# Patient Record
Sex: FEMALE | Race: WHITE | NOT HISPANIC OR LATINO
[De-identification: names, ages, dates, MRNs, and addresses within clinical notes are randomized per-mention and may not be internally consistent; named-entity substitution may affect disease eponyms.]

---

## 2021-06-14 PROBLEM — Z00.00 ENCOUNTER FOR PREVENTIVE HEALTH EXAMINATION: Status: ACTIVE | Noted: 2021-06-14

## 2021-07-15 ENCOUNTER — APPOINTMENT (OUTPATIENT)
Dept: HEART AND VASCULAR | Facility: CLINIC | Age: 81
End: 2021-07-15
Payer: MEDICARE

## 2021-07-15 ENCOUNTER — NON-APPOINTMENT (OUTPATIENT)
Age: 81
End: 2021-07-15

## 2021-07-15 VITALS
BODY MASS INDEX: 29.66 KG/M2 | SYSTOLIC BLOOD PRESSURE: 150 MMHG | HEIGHT: 67 IN | HEART RATE: 100 BPM | RESPIRATION RATE: 18 BRPM | TEMPERATURE: 96.9 F | DIASTOLIC BLOOD PRESSURE: 96 MMHG | WEIGHT: 189 LBS | OXYGEN SATURATION: 97 %

## 2021-07-15 DIAGNOSIS — Z80.42 FAMILY HISTORY OF MALIGNANT NEOPLASM OF PROSTATE: ICD-10-CM

## 2021-07-15 DIAGNOSIS — R92.1 MAMMOGRAPHIC CALCIFICATION FOUND ON DIAGNOSTIC IMAGING OF BREAST: ICD-10-CM

## 2021-07-15 DIAGNOSIS — Z82.61 FAMILY HISTORY OF ARTHRITIS: ICD-10-CM

## 2021-07-15 DIAGNOSIS — Z86.79 PERSONAL HISTORY OF OTHER DISEASES OF THE CIRCULATORY SYSTEM: ICD-10-CM

## 2021-07-15 DIAGNOSIS — Z86.19 PERSONAL HISTORY OF OTHER INFECTIOUS AND PARASITIC DISEASES: ICD-10-CM

## 2021-07-15 DIAGNOSIS — Z82.49 FAMILY HISTORY OF ISCHEMIC HEART DISEASE AND OTHER DISEASES OF THE CIRCULATORY SYSTEM: ICD-10-CM

## 2021-07-15 DIAGNOSIS — R53.83 OTHER FATIGUE: ICD-10-CM

## 2021-07-15 DIAGNOSIS — R06.00 DYSPNEA, UNSPECIFIED: ICD-10-CM

## 2021-07-15 DIAGNOSIS — Z78.9 OTHER SPECIFIED HEALTH STATUS: ICD-10-CM

## 2021-07-15 DIAGNOSIS — M20.60 ACQUIRED DEFORMITIES OF TOE(S), UNSPECIFIED, UNSPECIFIED FOOT: ICD-10-CM

## 2021-07-15 PROCEDURE — 93000 ELECTROCARDIOGRAM COMPLETE: CPT

## 2021-07-15 PROCEDURE — 99072 ADDL SUPL MATRL&STAF TM PHE: CPT

## 2021-07-15 PROCEDURE — 99205 OFFICE O/P NEW HI 60 MIN: CPT

## 2021-07-15 RX ORDER — CARVEDILOL PHOSPHATE 20 MG/1
20 CAPSULE, EXTENDED RELEASE ORAL DAILY
Refills: 0 | Status: ACTIVE | COMMUNITY

## 2021-07-15 RX ORDER — CHROMIUM 200 MCG
25 MCG TABLET ORAL
Refills: 0 | Status: ACTIVE | COMMUNITY

## 2021-07-15 RX ORDER — DOXYCYCLINE HYCLATE 100 MG/1
100 TABLET ORAL TWICE DAILY
Refills: 0 | Status: DISCONTINUED | COMMUNITY
End: 2021-07-15

## 2021-07-15 NOTE — REASON FOR VISIT
[Arrhythmia/ECG Abnorrmalities] : arrhythmia/ECG abnormalities [Hyperlipidemia] : hyperlipidemia [Hypertension] : hypertension [Symptom and Test Evaluation] : symptom and test evaluation [FreeTextEntry3] : Elizabeth Vega

## 2021-07-15 NOTE — HISTORY OF PRESENT ILLNESS
[FreeTextEntry1] : Millie lOiver is a 79 yo female who presents for CV evaluation.\par \par She denies cp.  She has noted increased fatigue and CLAIRE over the last 1.5 years.  She denies pnd, orthopnea, edema, palp, or loc.\par \par She was noted to have new RBBB on ECG during primary care eval.  She also was noted to have inc Ca level.\par \par She is active but not exercising.  She is compliant with meds.\par \par ECG today reveals NSR, RBBB\par \par Clinical hx reviewed in detail.\par \par Recommendations:\par 1. exercise\par 2. inc po water intake; salt restrict\par 3. watch BP in follow up visits\par 4. EXSE\par 5. carotid duplex\par 6. CPET\par 7. t/c additional eval for BP\par 8. consider Wellness referral\par 8. Endocrine eval to take place

## 2021-07-15 NOTE — DISCUSSION/SUMMARY
[Bundle Branch Block] : ~T bundle branch block [Hyperlipidemia] : hyperlipidemia [Stable] : stable [Diet Modification] : diet modification [Exercise] : exercise [Hypertension] : hypertension [Exercise Regimen] : an exercise regimen [Dietary Modification] : dietary modification [Weight Loss] : weight loss [Low Sodium Diet] : low sodium diet [Deteriorating] : deteriorating [None] : none [Cardiac Rehabilitation Program] : cardiac rehabilitation program [Patient] : the patient [de-identified] : CLAIRE/Fatigue

## 2021-08-10 ENCOUNTER — APPOINTMENT (OUTPATIENT)
Dept: HEART AND VASCULAR | Facility: CLINIC | Age: 81
End: 2021-08-10
Payer: MEDICARE

## 2021-08-10 VITALS
HEIGHT: 67 IN | HEART RATE: 84 BPM | OXYGEN SATURATION: 98 % | BODY MASS INDEX: 29.66 KG/M2 | SYSTOLIC BLOOD PRESSURE: 176 MMHG | WEIGHT: 189 LBS | DIASTOLIC BLOOD PRESSURE: 88 MMHG

## 2021-08-10 PROCEDURE — 93880 EXTRACRANIAL BILAT STUDY: CPT

## 2021-08-10 PROCEDURE — 93351 STRESS TTE COMPLETE: CPT

## 2021-08-10 PROCEDURE — 93320 DOPPLER ECHO COMPLETE: CPT

## 2021-08-10 PROCEDURE — 93325 DOPPLER ECHO COLOR FLOW MAPG: CPT

## 2021-08-23 ENCOUNTER — APPOINTMENT (OUTPATIENT)
Dept: HEART AND VASCULAR | Facility: CLINIC | Age: 81
End: 2021-08-23

## 2021-08-25 ENCOUNTER — NON-APPOINTMENT (OUTPATIENT)
Age: 81
End: 2021-08-25

## 2021-09-02 ENCOUNTER — APPOINTMENT (OUTPATIENT)
Dept: HEART AND VASCULAR | Facility: CLINIC | Age: 81
End: 2021-09-02
Payer: MEDICARE

## 2021-09-02 PROCEDURE — 99443: CPT

## 2021-09-02 RX ORDER — ASPIRIN ENTERIC COATED TABLETS 81 MG 81 MG/1
81 TABLET, DELAYED RELEASE ORAL
Refills: 0 | Status: ACTIVE | COMMUNITY

## 2021-09-02 NOTE — PHYSICAL EXAM

## 2021-09-02 NOTE — REASON FOR VISIT
[Symptom and Test Evaluation] : symptom and test evaluation [Arrhythmia/ECG Abnorrmalities] : arrhythmia/ECG abnormalities [Structural Heart and Valve Disease] : structural heart and valve disease [Hyperlipidemia] : hyperlipidemia [Hypertension] : hypertension [Coronary Artery Disease] : coronary artery disease [Carotid, Aortic and Peripheral Vascular Disease] : carotid, aortic and peripheral vascular disease [FreeTextEntry3] : Elizabeth Vega

## 2021-09-02 NOTE — HISTORY OF PRESENT ILLNESS
[FreeTextEntry1] : Millie Oliver requests for telephone visit.  Consent obtained and recorded.  She is at her home and doctor is in [place], [place].\par \par She denies cp.  Her CLAIRE and fatigue are improved.  She denies pnd, orthopnea, edema, palp, or loc.\par \par She is active but not exercising.  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  Over the last two days, she has developed intermittent fevers at home.  She denies any ateriotomy site redness, swelling, discharge.  She denies cough, sore throat.  She mild decrease in appetite.  Fever responds to Tylenol.\par \par Clinical hx nd results reviewed in detail.\par \par PE from 7/2021 eval.\par \par Recommendations:\par 1. exercise\par 2. inc po water intake; salt restrict\par 3. continue CV meds\par 4. will follow fever with f/u phone call in am.\par 5. cardiac rehab referral\par 6. Endocrine eval to take place

## 2021-09-02 NOTE — REVIEW OF SYSTEMS
[Fever] : fever [Headache] : no headache [Chills] : no chills [Feeling Fatigued] : not feeling fatigued [Abdominal Pain] : no abdominal pain [Nausea] : no nausea [Vomiting] : no vomiting [Heartburn] : no heartburn [Change in Appetite] : change in appetite [Change In The Stool] : no change in stool [Dysphagia] : no dysphagia [Diarrhea] : diarrhea [Constipation] : no constipation [Blood in Stool] : no blood in stool [Negative] : Heme/Lymph

## 2021-09-17 NOTE — DISCUSSION/SUMMARY
[Bundle Branch Block] : ~T bundle branch block [Deteriorating] : deteriorating [Coronary Artery Disease] : coronary artery disease [Responding to Treatment] : responding to treatment [Weight Reduction] : weight reduction [Hyperlipidemia] : hyperlipidemia [None] : There are no changes in medication management [Diet Modification] : diet modification [Exercise] : exercise [Hypertension] : hypertension [Exercise Regimen] : an exercise regimen [Dietary Modification] : dietary modification [Weight Loss] : weight loss [Low Sodium Diet] : low sodium diet [Peripheral Vascular Disease] : peripheral vascular disease [Stable] : stable [Medication Changes Per Orders] : Medication changes are as documented in orders [Exercise Rehab] : exercise rehabilitation [Patient] : the patient [de-identified] : min carotid disease [FreeTextEntry1] : TR - mild independent

## 2021-10-11 ENCOUNTER — APPOINTMENT (OUTPATIENT)
Dept: SURGERY | Facility: CLINIC | Age: 81
End: 2021-10-11
Payer: MEDICARE

## 2021-10-11 VITALS
HEART RATE: 73 BPM | WEIGHT: 175 LBS | BODY MASS INDEX: 27.47 KG/M2 | DIASTOLIC BLOOD PRESSURE: 90 MMHG | SYSTOLIC BLOOD PRESSURE: 159 MMHG | HEIGHT: 67 IN | TEMPERATURE: 97.6 F

## 2021-10-11 PROCEDURE — 99204 OFFICE O/P NEW MOD 45 MIN: CPT

## 2021-10-11 NOTE — ASSESSMENT
[FreeTextEntry1] : 79 yo F with likely primary hyperparathyroidism. \par \par We had a long discussion about thyroid and parathyroid anatomy, physiology and pathophysiology. We discussed the diagnosis of primary hyperparathyroidism and its manifestations as single and double adenomas as well as 4-gland hyperplasia. We spoke about the utility of localization studies and their role in operative planning in terms of focused parathyroidectomy and 4-gland exploration. We discussed intraoperative PTH and nerve monitoring. We discussed the indications for surgery and the risks of surgery including bleeding, infection, recurrence of disease, injury to surrounding structures. We also discussed postop recovery including pain control, activity, diet and possible need for temporary calcium supplementation.\par \par We will also confer with her cardiology team in regards to recently placed cardiac stent and to assess best timing for surgery.\par \par The patient understands all this and would like to proceed.\par Will plan for parathyroidectomy once localization studies are completed and it is deemed safe from a cardiac standpoint. \par \par \par Surgery will be under general anesthesia with intraoperative nerve and PTH monitoring at [facility] as an ambulatory or 23h stay procedure.\par Patient will obtain medical and cardiac clearance prior to surgery.

## 2021-10-11 NOTE — PHYSICAL EXAM
[Respiratory Effort] : normal respiratory effort [Normal Rate and Rhythm] : normal rate and rhythm [No Rash or Lesion] : No rash or lesion [Alert] : alert [Calm] : calm [de-identified] : NAD, well-appearing [de-identified] : Anicteric [de-identified] : Bedside US showed tiny right-sided nodules and a small hypoechoic lesion posteriorly on the left at the midpole possibly a parathyroid.  [de-identified] : soft, nondistended

## 2021-10-11 NOTE — HISTORY OF PRESENT ILLNESS
[de-identified] : 81 yo F referred by Dr. Vanessa for evaluation of primary hyperparathyroidism. The patient was recently noted to have hypercalcemia on routine bloodwork in June 2021. She was referred to Dr. Vanessa for further workup. Her calcium ranges from 9.6 to 10.7, with the most recent labs in 9/2021 showing a calcium of 10 and PTH of 89. Her Vit D is 28.1.  Last DEXA in July 2021 showed osteopenia to T-2.3 in the L femoral neck. A 24 hour urine calcium was low at < 26. She underwent US which showed several sub-cm nodules in the thyroid. \par Patient reports fatigue, increased thirst and memory trouble but denies sara pains, joint pains, GI disturbances, kidney stones, anxiety/depression. \par Of note, she had recent cardiac cath with stent placement in 8/2021. She is no longer on Plavix.

## 2021-10-11 NOTE — CONSULT LETTER
[Dear  ___] : Dear  [unfilled], [( Thank you for referring [unfilled] for consultation for _____ )] : Thank you for referring [unfilled] for consultation for [unfilled] [Please see my note below.] : Please see my note below. [Consult Closing:] : Thank you very much for allowing me to participate in the care of this patient.  If you have any questions, please do not hesitate to contact me. [Sincerely,] : Sincerely, [FreeTextEntry3] : La Vides MD [___] : [unfilled]

## 2021-10-12 LAB
ANION GAP SERPL CALC-SCNC: 10 MMOL/L
BUN SERPL-MCNC: 23 MG/DL
CALCIUM SERPL-MCNC: 10.1 MG/DL
CHLORIDE SERPL-SCNC: 106 MMOL/L
CO2 SERPL-SCNC: 25 MMOL/L
CREAT SERPL-MCNC: 1.04 MG/DL
GLUCOSE SERPL-MCNC: 92 MG/DL
POTASSIUM SERPL-SCNC: 5 MMOL/L
SODIUM SERPL-SCNC: 141 MMOL/L

## 2021-11-04 ENCOUNTER — NON-APPOINTMENT (OUTPATIENT)
Age: 81
End: 2021-11-04

## 2021-12-01 RX ORDER — CLOPIDOGREL BISULFATE 75 MG/1
75 TABLET, FILM COATED ORAL
Refills: 0 | Status: DISCONTINUED | COMMUNITY
End: 2021-12-01

## 2021-12-02 ENCOUNTER — APPOINTMENT (OUTPATIENT)
Dept: SURGERY | Facility: HOSPITAL | Age: 81
End: 2021-12-02

## 2021-12-15 ENCOUNTER — APPOINTMENT (OUTPATIENT)
Dept: SURGERY | Facility: CLINIC | Age: 81
End: 2021-12-15
Payer: MEDICARE

## 2021-12-15 ENCOUNTER — NON-APPOINTMENT (OUTPATIENT)
Age: 81
End: 2021-12-15

## 2021-12-15 VITALS
TEMPERATURE: 97.7 F | SYSTOLIC BLOOD PRESSURE: 146 MMHG | BODY MASS INDEX: 26.84 KG/M2 | HEIGHT: 67 IN | HEART RATE: 71 BPM | WEIGHT: 171 LBS | DIASTOLIC BLOOD PRESSURE: 89 MMHG

## 2021-12-15 PROCEDURE — 99024 POSTOP FOLLOW-UP VISIT: CPT

## 2021-12-16 NOTE — ASSESSMENT
[FreeTextEntry1] : 81 yo F recovering well from parathyroidectomy for single adenoma.\par Will check PTH and calcium today\par F/u in 6 months for repeat bloodwork.

## 2021-12-16 NOTE — PHYSICAL EXAM
[Respiratory Effort] : normal respiratory effort [Normal Rate and Rhythm] : normal rate and rhythm [No Rash or Lesion] : No rash or lesion [de-identified] : NAD, well-appearing [de-identified] : well-healing neck incision, c/d/i

## 2021-12-16 NOTE — HISTORY OF PRESENT ILLNESS
[de-identified] : 9 [de-identified] : Patient returns s/p parathyroidectomy on 12/2/21. She is s/p excision of an enlarged left upper parathyroid gland with normalization of PTH from 130 at time of excision to 27 post excision. She has been feeling more energetic. She has no pain. Her incision is healing well. She is speaking in a normal voice. She has mild tingling of her fingertips occasionally.

## 2021-12-20 LAB
ANION GAP SERPL CALC-SCNC: 14 MMOL/L
BUN SERPL-MCNC: 25 MG/DL
CALCIUM SERPL-MCNC: 9.4 MG/DL
CALCIUM SERPL-MCNC: 9.4 MG/DL
CHLORIDE SERPL-SCNC: 106 MMOL/L
CO2 SERPL-SCNC: 21 MMOL/L
CREAT SERPL-MCNC: 1.13 MG/DL
GLUCOSE SERPL-MCNC: 82 MG/DL
PARATHYROID HORMONE INTACT: 71 PG/ML
POTASSIUM SERPL-SCNC: 4.9 MMOL/L
SODIUM SERPL-SCNC: 141 MMOL/L

## 2022-05-26 ENCOUNTER — APPOINTMENT (OUTPATIENT)
Dept: HEART AND VASCULAR | Facility: CLINIC | Age: 82
End: 2022-05-26
Payer: MEDICARE

## 2022-05-26 VITALS
DIASTOLIC BLOOD PRESSURE: 82 MMHG | HEART RATE: 75 BPM | HEIGHT: 67 IN | SYSTOLIC BLOOD PRESSURE: 132 MMHG | WEIGHT: 173 LBS | TEMPERATURE: 97.2 F | OXYGEN SATURATION: 98 % | RESPIRATION RATE: 17 BRPM | BODY MASS INDEX: 27.15 KG/M2

## 2022-05-26 PROCEDURE — 99215 OFFICE O/P EST HI 40 MIN: CPT

## 2022-05-26 RX ORDER — ATORVASTATIN CALCIUM 80 MG/1
80 TABLET, FILM COATED ORAL
Refills: 0 | Status: ACTIVE | COMMUNITY

## 2022-05-27 NOTE — DISCUSSION/SUMMARY
[Bundle Branch Block] : ~T bundle branch block [Coronary Artery Disease] : coronary artery disease [Weight Reduction] : weight reduction [Hyperlipidemia] : hyperlipidemia [Diet Modification] : diet modification [Exercise] : exercise [Hypertension] : hypertension [Exercise Regimen] : an exercise regimen [Dietary Modification] : dietary modification [Weight Loss] : weight loss [Low Sodium Diet] : low sodium diet [Peripheral Vascular Disease] : peripheral vascular disease [Stable] : stable [None] : There are no changes in medication management [Exercise Rehab] : exercise rehabilitation [Patient] : the patient [de-identified] : min carotid disease [FreeTextEntry1] : TR - mild

## 2022-05-27 NOTE — REVIEW OF SYSTEMS
[Fever] : no fever [Headache] : no headache [Chills] : no chills [Feeling Fatigued] : not feeling fatigued [Abdominal Pain] : no abdominal pain [Nausea] : no nausea [Vomiting] : no vomiting [Heartburn] : no heartburn [Change in Appetite] : no change in appetite [Change In The Stool] : no change in stool [Dysphagia] : no dysphagia [Diarrhea] : diarrhea [Constipation] : no constipation [Blood in Stool] : no blood in stool [Negative] : Heme/Lymph

## 2022-05-27 NOTE — HISTORY OF PRESENT ILLNESS
[FreeTextEntry1] : Millie Oliver returns for follow up.  \par \par She denies cp, sob, pnd, orthopnea, edema, palp, or loc.\par \par She is active.  She is compliant with meds.\par \par Carotid Duplex 8/2021: min disease b/l\par EXSE 8/2021: nl lv sys fxn; indeterminant goss fxn; mild TR; 1:33 min Feliz; no ischemia (dec sens dec ex tolerance)\par Cardiac Cath 8/2021: severe LAD disease; s/p successful PCI using VENTURA\par \par Clinical hx reviewed in detail.\par \par \par Recommendations:\par 1. exercise\par 2. inc po water intake; salt restrict\par 3. continue CV meds\par 4. Mediterranean diet\par 5. f/u in 4 months

## 2022-05-27 NOTE — PHYSICAL EXAM

## 2022-06-13 ENCOUNTER — APPOINTMENT (OUTPATIENT)
Dept: SURGERY | Facility: CLINIC | Age: 82
End: 2022-06-13
Payer: MEDICARE

## 2022-06-13 VITALS
DIASTOLIC BLOOD PRESSURE: 80 MMHG | TEMPERATURE: 97 F | HEART RATE: 77 BPM | BODY MASS INDEX: 27.8 KG/M2 | HEIGHT: 66 IN | SYSTOLIC BLOOD PRESSURE: 148 MMHG | WEIGHT: 173 LBS

## 2022-06-13 DIAGNOSIS — E21.0 PRIMARY HYPERPARATHYROIDISM: ICD-10-CM

## 2022-06-13 PROCEDURE — 99213 OFFICE O/P EST LOW 20 MIN: CPT

## 2022-06-14 NOTE — CONSULT LETTER
[Dear  ___] : Dear  [unfilled], [Courtesy Letter:] : I had the pleasure of seeing your patient, [unfilled], in my office today. [Please see my note below.] : Please see my note below. [Consult Closing:] : Thank you very much for allowing me to participate in the care of this patient.  If you have any questions, please do not hesitate to contact me. [Sincerely,] : Sincerely, [FreeTextEntry3] : La Vides MD

## 2022-06-14 NOTE — HISTORY OF PRESENT ILLNESS
[de-identified] : Patient returns for follow up. She has been doing well without complaints. She still has fatigue at the end of the day. She is s/p left upper parathyroidectomy with intraop -->27 drop. \par Recent labs in March 2022 showed PTH 69, VItD 43, Ca 9.5.

## 2022-06-14 NOTE — PHYSICAL EXAM
[Respiratory Effort] : normal respiratory effort [Normal Rate and Rhythm] : normal rate and rhythm [No Rash or Lesion] : No rash or lesion [Alert] : alert [Calm] : calm [de-identified] : NAD, well-appearing [de-identified] : well-healed parathyroid incision c.d.i [de-identified] : soft, nondistended

## 2022-06-14 NOTE — ASSESSMENT
[FreeTextEntry1] : 82 yo F s/p parathyroidectomy with mildly elevated PTH. At this point, calcium is normal so would continue to monitor. We discussed that sometimes you can have a double adenoma that is not obvious until the larger gland is removed. Her contralateral glands were normal on exploration so it is less likely she has 4 gland disease. We will continue to monitor closely and if values rise in the future, may consider reoperation.\par Will check bloodwork today.

## 2022-06-15 ENCOUNTER — NON-APPOINTMENT (OUTPATIENT)
Age: 82
End: 2022-06-15

## 2022-06-16 LAB
25(OH)D3 SERPL-MCNC: 44.1 NG/ML
ANION GAP SERPL CALC-SCNC: 13 MMOL/L
BUN SERPL-MCNC: 32 MG/DL
CALCIUM SERPL-MCNC: 9 MG/DL
CALCIUM SERPL-MCNC: 9 MG/DL
CHLORIDE SERPL-SCNC: 105 MMOL/L
CO2 SERPL-SCNC: 23 MMOL/L
CREAT SERPL-MCNC: 1.13 MG/DL
EGFR: 49 ML/MIN/1.73M2
GLUCOSE SERPL-MCNC: 83 MG/DL
PARATHYROID HORMONE INTACT: 69 PG/ML
POTASSIUM SERPL-SCNC: 4.5 MMOL/L
SODIUM SERPL-SCNC: 141 MMOL/L

## 2022-09-22 ENCOUNTER — APPOINTMENT (OUTPATIENT)
Dept: HEART AND VASCULAR | Facility: CLINIC | Age: 82
End: 2022-09-22

## 2022-09-22 VITALS
SYSTOLIC BLOOD PRESSURE: 120 MMHG | TEMPERATURE: 97.6 F | WEIGHT: 172 LBS | HEART RATE: 78 BPM | OXYGEN SATURATION: 98 % | HEIGHT: 66 IN | DIASTOLIC BLOOD PRESSURE: 87 MMHG | RESPIRATION RATE: 16 BRPM | BODY MASS INDEX: 27.64 KG/M2

## 2022-09-22 PROCEDURE — 99215 OFFICE O/P EST HI 40 MIN: CPT

## 2022-09-22 RX ORDER — CHOLECALCIFEROL (VITAMIN D3) 50 MCG
CAPSULE ORAL DAILY
Refills: 0 | Status: ACTIVE | COMMUNITY

## 2022-09-25 NOTE — HISTORY OF PRESENT ILLNESS
[FreeTextEntry1] : Millie Oliver returns for follow up.  \par \par She denies cp, sob, pnd, orthopnea, edema, palp, or loc.\par \par She is active.  She is compliant with meds.\par \par Carotid Duplex 8/2021: min disease b/l\par EXSE 8/2021: nl lv sys fxn; indeterminant goss fxn; mild TR; 1:33 min Feliz; no ischemia (dec sens dec ex tolerance)\par Cardiac Cath 8/2021: severe LAD disease; s/p successful PCI using VENTURA\par \par Clinical hx reviewed in detail.\par \par Recommendations:\par 1. exercise\par 2. inc po water intake; salt restrict\par 3. continue CV meds - ok t/c alternate to ACE inh -> ARB\par 4. Mediterranean diet\par 5. EXSE

## 2022-09-25 NOTE — PHYSICAL EXAM

## 2022-09-25 NOTE — DISCUSSION/SUMMARY
[Bundle Branch Block] : ~T bundle branch block [Coronary Artery Disease] : coronary artery disease [Weight Reduction] : weight reduction [Hyperlipidemia] : hyperlipidemia [Diet Modification] : diet modification [Exercise] : exercise [Hypertension] : hypertension [Exercise Regimen] : an exercise regimen [Dietary Modification] : dietary modification [Weight Loss] : weight loss [Low Sodium Diet] : low sodium diet [Peripheral Vascular Disease] : peripheral vascular disease [Stable] : stable [None] : There are no changes in medication management [Exercise Rehab] : exercise rehabilitation [Patient] : the patient [de-identified] : min carotid disease [FreeTextEntry1] : TR - mild

## 2022-11-17 ENCOUNTER — APPOINTMENT (OUTPATIENT)
Dept: HEART AND VASCULAR | Facility: CLINIC | Age: 82
End: 2022-11-17

## 2022-11-17 VITALS
SYSTOLIC BLOOD PRESSURE: 138 MMHG | HEIGHT: 66 IN | WEIGHT: 172 LBS | HEART RATE: 72 BPM | BODY MASS INDEX: 27.64 KG/M2 | DIASTOLIC BLOOD PRESSURE: 72 MMHG | OXYGEN SATURATION: 97 %

## 2022-11-17 PROCEDURE — 93351 STRESS TTE COMPLETE: CPT

## 2022-11-17 PROCEDURE — 93320 DOPPLER ECHO COMPLETE: CPT

## 2022-11-17 PROCEDURE — 93325 DOPPLER ECHO COLOR FLOW MAPG: CPT

## 2023-01-03 ENCOUNTER — APPOINTMENT (OUTPATIENT)
Dept: HEART AND VASCULAR | Facility: CLINIC | Age: 83
End: 2023-01-03

## 2023-01-04 ENCOUNTER — NON-APPOINTMENT (OUTPATIENT)
Age: 83
End: 2023-01-04

## 2023-02-28 ENCOUNTER — APPOINTMENT (OUTPATIENT)
Dept: HEART AND VASCULAR | Facility: CLINIC | Age: 83
End: 2023-02-28
Payer: MEDICARE

## 2023-02-28 VITALS
OXYGEN SATURATION: 97 % | WEIGHT: 178 LBS | HEIGHT: 66 IN | RESPIRATION RATE: 16 BRPM | TEMPERATURE: 97.2 F | BODY MASS INDEX: 28.61 KG/M2 | SYSTOLIC BLOOD PRESSURE: 130 MMHG | DIASTOLIC BLOOD PRESSURE: 82 MMHG | HEART RATE: 76 BPM

## 2023-02-28 DIAGNOSIS — I51.89 OTHER ILL-DEFINED HEART DISEASES: ICD-10-CM

## 2023-02-28 PROCEDURE — 99215 OFFICE O/P EST HI 40 MIN: CPT

## 2023-02-28 RX ORDER — ENALAPRIL MALEATE 10 MG/1
10 TABLET ORAL DAILY
Refills: 0 | Status: ACTIVE | COMMUNITY

## 2023-02-28 RX ORDER — AMLODIPINE BESYLATE 5 MG/1
5 TABLET ORAL
Refills: 0 | Status: ACTIVE | COMMUNITY

## 2023-02-28 RX ORDER — ENALAPRIL MALEATE AND HYDROCHLOROTHIAZIDE 10; 25 MG/1; MG/1
10-25 TABLET ORAL DAILY
Refills: 0 | Status: DISCONTINUED | COMMUNITY
End: 2023-02-28

## 2023-02-28 NOTE — DISCUSSION/SUMMARY
[Bundle Branch Block] : ~T bundle branch block [Coronary Artery Disease] : coronary artery disease [Weight Reduction] : weight reduction [Hyperlipidemia] : hyperlipidemia [Diet Modification] : diet modification [Exercise] : exercise [Hypertension] : hypertension [Exercise Regimen] : an exercise regimen [Dietary Modification] : dietary modification [Weight Loss] : weight loss [Low Sodium Diet] : low sodium diet [Peripheral Vascular Disease] : peripheral vascular disease [Exercise Rehab] : exercise rehabilitation [Pulmonary Hypertension (PH)] : pulmonary hypertension (PH) [Stable] : stable [None] : There are no changes in medication management [Patient] : the patient [de-identified] : min carotid disease [de-identified] : mild [FreeTextEntry1] : TR - mild to mod

## 2023-02-28 NOTE — HISTORY OF PRESENT ILLNESS
[FreeTextEntry1] : Millie Oliver returns for follow up.  \par \par She denies cp, sob, pnd, orthopnea, edema, palp, or loc.\par \par She is active.  She is compliant with meds.\par \par Carotid Duplex 8/2021: min disease b/l\par EXSE 8/2021: nl lv sys fxn; indeterminant goss fxn; mild TR; 1:33 min Feliz; no ischemia (dec sens dec ex tolerance)\par Cardiac Cath 8/2021: severe LAD disease; s/p successful PCI using VENTURA\par EXSE 11/2022: nl lv sys fxn; indeterminant goss fxn; mild to mod TR; mild pulm htn; 5:05 min Feliz; no ischemia; PVC's\par \par Clinical hx and results reviewed in detail.\par \par Recommendations:\par 1. exercise\par 2. inc po water intake; salt restrict\par 3. continue CV meds - ok t/c alternate to ACE inh -> ARB\par 4. Mediterranean diet\par 5. f/u in 6 months

## 2023-02-28 NOTE — PHYSICAL EXAM

## 2023-08-29 ENCOUNTER — APPOINTMENT (OUTPATIENT)
Dept: HEART AND VASCULAR | Facility: CLINIC | Age: 83
End: 2023-08-29
Payer: MEDICARE

## 2023-08-29 VITALS
HEIGHT: 66 IN | BODY MASS INDEX: 28.61 KG/M2 | WEIGHT: 178 LBS | RESPIRATION RATE: 16 BRPM | SYSTOLIC BLOOD PRESSURE: 118 MMHG | TEMPERATURE: 97 F | OXYGEN SATURATION: 98 % | DIASTOLIC BLOOD PRESSURE: 70 MMHG | HEART RATE: 71 BPM

## 2023-08-29 DIAGNOSIS — I07.1 RHEUMATIC TRICUSPID INSUFFICIENCY: ICD-10-CM

## 2023-08-29 DIAGNOSIS — R94.31 ABNORMAL ELECTROCARDIOGRAM [ECG] [EKG]: ICD-10-CM

## 2023-08-29 DIAGNOSIS — I65.29 OCCLUSION AND STENOSIS OF UNSPECIFIED CAROTID ARTERY: ICD-10-CM

## 2023-08-29 PROCEDURE — 99215 OFFICE O/P EST HI 40 MIN: CPT

## 2023-08-29 RX ORDER — HYDROCHLOROTHIAZIDE 12.5 MG/1
12.5 TABLET ORAL
Refills: 0 | Status: ACTIVE | COMMUNITY

## 2023-08-29 NOTE — DISCUSSION/SUMMARY
[Bundle Branch Block] : ~T bundle branch block [Coronary Artery Disease] : coronary artery disease [Weight Reduction] : weight reduction [Hyperlipidemia] : hyperlipidemia [Diet Modification] : diet modification [Exercise] : exercise [Hypertension] : hypertension [Exercise Regimen] : an exercise regimen [Dietary Modification] : dietary modification [Weight Loss] : weight loss [Low Sodium Diet] : low sodium diet [Peripheral Vascular Disease] : peripheral vascular disease [Exercise Rehab] : exercise rehabilitation [Pulmonary Hypertension (PH)] : pulmonary hypertension (PH) [Stable] : stable [None] : There are no changes in medication management [Patient] : the patient [de-identified] : min carotid disease [de-identified] : mild [FreeTextEntry1] : TR - mild to mod

## 2023-08-29 NOTE — HISTORY OF PRESENT ILLNESS
[FreeTextEntry1] : Millie Oliver returns for follow up.    She was treated for tick bite.  She is preparing for extensive dental work.  She denies cp, sob, pnd, orthopnea, edema, palp, or loc.  She is active.  She is compliant with meds (hctz has been added fro better BP control).  Carotid Duplex 8/2021: min disease b/l EXSE 8/2021: nl lv sys fxn; indeterminant goss fxn; mild TR; 1:33 min Feliz; no ischemia (dec sens dec ex tolerance) Cardiac Cath 8/2021: severe LAD disease; s/p successful PCI using VENTURA EXSE 11/2022: nl lv sys fxn; indeterminant goss fxn; mild to mod TR; mild pulm htn; 5:05 min Feliz; no ischemia; PVC's  Clinical hx reviewed in detail.  Recommendations: 1. exercise 2. inc po water intake; salt restrict 3. continue CV meds 4. Mediterranean diet 5. f/u in 6 months 6. ok to proceed with dental work from CV perspective.

## 2023-08-29 NOTE — PHYSICAL EXAM

## 2024-04-24 ENCOUNTER — APPOINTMENT (OUTPATIENT)
Dept: HEART AND VASCULAR | Facility: CLINIC | Age: 84
End: 2024-04-24

## 2024-05-29 ENCOUNTER — NON-APPOINTMENT (OUTPATIENT)
Age: 84
End: 2024-05-29

## 2024-05-29 ENCOUNTER — APPOINTMENT (OUTPATIENT)
Dept: HEART AND VASCULAR | Facility: CLINIC | Age: 84
End: 2024-05-29
Payer: MEDICARE

## 2024-05-29 VITALS
BODY MASS INDEX: 28.12 KG/M2 | OXYGEN SATURATION: 98 % | DIASTOLIC BLOOD PRESSURE: 68 MMHG | HEART RATE: 81 BPM | WEIGHT: 175 LBS | RESPIRATION RATE: 16 BRPM | HEIGHT: 66 IN | SYSTOLIC BLOOD PRESSURE: 130 MMHG

## 2024-05-29 DIAGNOSIS — I27.20 PULMONARY HYPERTENSION, UNSPECIFIED: ICD-10-CM

## 2024-05-29 DIAGNOSIS — I25.10 ATHEROSCLEROTIC HEART DISEASE OF NATIVE CORONARY ARTERY W/OUT ANGINA PECTORIS: ICD-10-CM

## 2024-05-29 DIAGNOSIS — E78.5 HYPERLIPIDEMIA, UNSPECIFIED: ICD-10-CM

## 2024-05-29 DIAGNOSIS — I10 ESSENTIAL (PRIMARY) HYPERTENSION: ICD-10-CM

## 2024-05-29 PROCEDURE — 93000 ELECTROCARDIOGRAM COMPLETE: CPT

## 2024-05-29 PROCEDURE — 99215 OFFICE O/P EST HI 40 MIN: CPT | Mod: 25

## 2024-05-30 PROBLEM — I10 HYPERTENSION, UNSPECIFIED TYPE: Status: ACTIVE | Noted: 2021-07-15

## 2024-05-30 PROBLEM — E78.5 HYPERLIPIDEMIA, UNSPECIFIED HYPERLIPIDEMIA TYPE: Status: ACTIVE | Noted: 2021-07-15

## 2024-05-30 PROBLEM — I25.10 ATHEROSCLEROSIS OF CORONARY ARTERY: Status: ACTIVE | Noted: 2021-09-02

## 2024-05-30 PROBLEM — I27.20 PULMONARY HTN: Status: ACTIVE | Noted: 2023-02-28

## 2024-06-09 NOTE — ASSESSMENT
[FreeTextEntry1] : 82 y/o female w/ PMHx of CAD (PCI prox LAD using VENTURA - 2021), HTN, HLD, pulm HTN, primary hyperparathyroidism here for follow up cardiac care  #ASCVD - CAD s/p PCI in 2021 - stress test and TTE pending - ASA if she can tolerate - continue statin - continue BB, amlodipine - continued risk factor modification - Cardiovascular risk factors discussed - carotid duplex pending   # Hypertension -  WNL today - continue current anti-hypertensives including BB, ACEI, CCB and HCTZ - home BP monitoring encouraged - continued exercise as tolerated - Low salt diet  # Hyperlipidemia -  well controlled; goal LDL of <70 - Continue current prescribed medications - Low fat low cholesterol diet - Heart healthy diet emphasized - Exercise as tolerated

## 2024-06-09 NOTE — HISTORY OF PRESENT ILLNESS
[FreeTextEntry1] : 84 y/o female w/ PMHx of CAD (PCI prox LAD using VENTURA - 2021), HTN, HLD, pulm HTN, primary hyperparathyroidism here for follow up cardiac care Previously followed by Dr. Manpreet Shah, last seen 11/2023 Feels well; denies any significant chest discomfort, palpitations, dyspnea, LE edema, PND or syncope. Active. Compliant with medications  Cardiac Workup: Carotid Duplex 8/2021: min disease b/l EXSE 8/2021: nl lv sys fxn; indeterminant goss fxn; mild TR; 1:33 min Feliz; no ischemia (dec sens dec ex tolerance) Cardiac Cath 8/2021: severe LAD disease; s/p successful PCI using VENTURA EXSE 11/2022: nl lv sys fxn; indeterminant goss fxn; mild to mod TR; mild pulm htn; 5:05 min Feliz; no ischemia; PVC's

## 2024-10-18 ENCOUNTER — APPOINTMENT (OUTPATIENT)
Dept: HEART AND VASCULAR | Facility: CLINIC | Age: 84
End: 2024-10-18
Payer: MEDICARE

## 2024-10-18 VITALS
SYSTOLIC BLOOD PRESSURE: 118 MMHG | DIASTOLIC BLOOD PRESSURE: 62 MMHG | HEIGHT: 66 IN | HEART RATE: 75 BPM | WEIGHT: 175 LBS | OXYGEN SATURATION: 97 % | BODY MASS INDEX: 28.12 KG/M2

## 2024-10-18 PROCEDURE — 93320 DOPPLER ECHO COMPLETE: CPT

## 2024-10-18 PROCEDURE — 93325 DOPPLER ECHO COLOR FLOW MAPG: CPT

## 2024-10-18 PROCEDURE — 93351 STRESS TTE COMPLETE: CPT

## 2024-10-18 PROCEDURE — 93880 EXTRACRANIAL BILAT STUDY: CPT

## 2024-10-28 ENCOUNTER — APPOINTMENT (OUTPATIENT)
Dept: HEART AND VASCULAR | Facility: CLINIC | Age: 84
End: 2024-10-28
Payer: MEDICARE

## 2024-10-28 ENCOUNTER — NON-APPOINTMENT (OUTPATIENT)
Age: 84
End: 2024-10-28

## 2024-10-28 VITALS
TEMPERATURE: 97.6 F | DIASTOLIC BLOOD PRESSURE: 80 MMHG | WEIGHT: 173 LBS | SYSTOLIC BLOOD PRESSURE: 136 MMHG | RESPIRATION RATE: 16 BRPM | HEIGHT: 66 IN | OXYGEN SATURATION: 98 % | BODY MASS INDEX: 27.8 KG/M2 | HEART RATE: 72 BPM

## 2024-10-28 PROCEDURE — 99214 OFFICE O/P EST MOD 30 MIN: CPT

## 2024-10-30 ENCOUNTER — TRANSCRIPTION ENCOUNTER (OUTPATIENT)
Age: 84
End: 2024-10-30

## 2025-01-27 ENCOUNTER — APPOINTMENT (OUTPATIENT)
Dept: HEART AND VASCULAR | Facility: CLINIC | Age: 85
End: 2025-01-27

## 2025-04-28 ENCOUNTER — APPOINTMENT (OUTPATIENT)
Dept: HEART AND VASCULAR | Facility: CLINIC | Age: 85
End: 2025-04-28

## 2025-06-23 ENCOUNTER — APPOINTMENT (OUTPATIENT)
Dept: HEART AND VASCULAR | Facility: CLINIC | Age: 85
End: 2025-06-23

## 2025-07-23 ENCOUNTER — APPOINTMENT (OUTPATIENT)
Dept: HEART AND VASCULAR | Facility: CLINIC | Age: 85
End: 2025-07-23
Payer: MEDICARE

## 2025-07-23 VITALS
SYSTOLIC BLOOD PRESSURE: 122 MMHG | HEIGHT: 67 IN | HEART RATE: 76 BPM | BODY MASS INDEX: 25.27 KG/M2 | OXYGEN SATURATION: 98 % | DIASTOLIC BLOOD PRESSURE: 72 MMHG | WEIGHT: 161 LBS | RESPIRATION RATE: 16 BRPM

## 2025-07-23 DIAGNOSIS — I10 ESSENTIAL (PRIMARY) HYPERTENSION: ICD-10-CM

## 2025-07-23 DIAGNOSIS — I25.10 ATHEROSCLEROTIC HEART DISEASE OF NATIVE CORONARY ARTERY W/OUT ANGINA PECTORIS: ICD-10-CM

## 2025-07-23 DIAGNOSIS — I65.29 OCCLUSION AND STENOSIS OF UNSPECIFIED CAROTID ARTERY: ICD-10-CM

## 2025-07-23 DIAGNOSIS — E78.5 HYPERLIPIDEMIA, UNSPECIFIED: ICD-10-CM

## 2025-07-23 PROCEDURE — 99214 OFFICE O/P EST MOD 30 MIN: CPT

## 2025-07-23 RX ORDER — HYDROCHLOROTHIAZIDE 12.5 MG/1
12.5 TABLET ORAL
Refills: 0 | Status: ACTIVE | COMMUNITY